# Patient Record
Sex: FEMALE | Race: OTHER | URBAN - METROPOLITAN AREA
[De-identification: names, ages, dates, MRNs, and addresses within clinical notes are randomized per-mention and may not be internally consistent; named-entity substitution may affect disease eponyms.]

---
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
## 2023-02-10 ENCOUNTER — LAB ONLY (OUTPATIENT)
Dept: FAMILY MEDICINE CLINIC | Age: 18
End: 2023-02-10

## 2023-02-10 ENCOUNTER — VIRTUAL VISIT (OUTPATIENT)
Dept: FAMILY MEDICINE CLINIC | Age: 18
End: 2023-02-10
Payer: COMMERCIAL

## 2023-02-10 DIAGNOSIS — J02.9 SORE THROAT: Primary | ICD-10-CM

## 2023-02-10 LAB
FLUAV+FLUBV AG NOSE QL IA.RAPID: NEGATIVE
FLUAV+FLUBV AG NOSE QL IA.RAPID: NEGATIVE
S PYO AG THROAT QL: POSITIVE
VALID INTERNAL CONTROL?: YES
VALID INTERNAL CONTROL?: YES

## 2023-02-10 NOTE — PROGRESS NOTES
Subjective: Marcelle Lemons presents for evaluation of Sore Throat     2-3 days of sore throat, enlarged tonsils, swollen lymph nodes and right sided headache, subjective fever (last yesterday)  Has been taking Advil for her sore throat  No known sick contact. COVID test negative  Hx of strep throat. Notes this feels similar      Physical Exam  General: alert, cooperative, no distress, appears stated age  Lung exam: no apparent respiratory distress or retractions. Able to speak in full sentences. Assessment/Plan:   1. Sore throat  -     AMB POC RAPID STREP A  -     AMB POC RAPID INFLUENZA TEST    Strep throat vs viral URI. Will have her come in for POC testing. If strep +, will send in antibiotics. Discussed symptomatic treatment with Advil. May use OTC cough/cold medication. Also consider lozenges, tea with honey, salt water gargles, etc.     The above diagnosis is a new problem. We discussed expected course, resolution, and complications of diagnosis in detail. Return if symptoms worsen or fail to improve. Dorinda Plaza, was evaluated through a synchronous (real-time) audio-video encounter. The patient (or guardian if applicable) is aware that this is a billable service. Verbal consent to proceed has been obtained within the past 12 months. The visit was conducted pursuant to the emergency declaration under the [address] [place] [address] authority and the [employer] Act. Patient identification was verified, and a caregiver was present when appropriate. The patient was located in a state where the provider was credentialed to provide care. Patient located at home ([place] [address])  Provider located at home ([place])    An electronic signature was used to authenticate this note.   -- Tyrone Sandhoff, MD

## 2023-02-15 ENCOUNTER — OFFICE VISIT (OUTPATIENT)
Dept: FAMILY MEDICINE CLINIC | Age: 18
End: 2023-02-15
Payer: COMMERCIAL

## 2023-02-15 VITALS
DIASTOLIC BLOOD PRESSURE: 75 MMHG | RESPIRATION RATE: 16 BRPM | BODY MASS INDEX: 16.39 KG/M2 | SYSTOLIC BLOOD PRESSURE: 103 MMHG | OXYGEN SATURATION: 98 % | WEIGHT: 102 LBS | TEMPERATURE: 97.9 F | HEIGHT: 66 IN | HEART RATE: 94 BPM

## 2023-02-15 DIAGNOSIS — J02.0 STREP PHARYNGITIS: ICD-10-CM

## 2023-02-15 DIAGNOSIS — K12.0 APHTHOUS ULCER: Primary | ICD-10-CM

## 2023-02-15 RX ORDER — AMOXICILLIN 500 MG/1
CAPSULE ORAL
COMMUNITY
Start: 2023-02-12

## 2023-02-15 NOTE — PROGRESS NOTES
Rm 13    Sores in mouth      Chief Complaint   Patient presents with    Mouth Lesions     1. Have you been to the ER, urgent care clinic since your last visit? Hospitalized since your last visit? No    2. Have you seen or consulted any other health care providers outside of the [address] [place] [address] since your last visit? Include any pap smears or colon screening.  No    Visit Vitals  /75 (BP 1 Location: Left upper arm, BP Patient Position: Sitting, BP Cuff Size: Adult)   Pulse (!) 112   Temp 97.9 °F (36.6 °C) (Oral)   Resp 16   Ht 5' 5.5\" (1.664 m)   Wt 102 lb (46.3 kg)   SpO2 98%   BMI 16.72 kg/m²

## 2023-02-15 NOTE — PROGRESS NOTES
[facility] [address] [place] [address]   Office [phone], Fax [phone]    Subjective:     Chief Complaint   Patient presents with    Mouth Lesions     History provided by patient     Kim Form is a 19 y.o. female presents for mouth ulcers. Ulcers have been present for about 5 days. There are about 5-6 total. They are located on the inside and outside of her lip and back of her throat. They have gotten slightly better since first appearing. She has been using an over the counter oragel mouth wash with numbing medicine in it with relief. She admits that she has had difficulty eating and drinking like normal due to the pain. Of note- was diagnosed with strep throat the same day the lesions began. Is currently still taking amox. Denies fevers. Medication reviewed. Current Outpatient Medications:     amoxicillin (AMOXIL) 500 mg capsule, TAKE 1 TABLET BY MOUTH TWICE DAILY FOR 10 DAYS, Disp: , Rfl:     clindamycin-tretinoin (VELTIN) 1.2-0.025 % topical gel, Apply pea-sized amount TOPICALLY to entire face at bedtime; dot onto chin, cheeks, nose, and forehead then gently rub over entire face. (Patient not taking: Reported on 2/15/2023), Disp: 30 g, Rfl: 3     Allergy reviewed. No Known Allergies     History reviewed. No pertinent past medical history.     Social History     Socioeconomic History    Marital status: SINGLE   Tobacco Use    Smoking status: Never    Smokeless tobacco: Never   Substance and Sexual Activity    Alcohol use: No    Sexual activity: Never     ROS:  General/Constitutional:  No headache, fever, fatigue, weight loss or weight gain     Eyes:  No redness, pain  Neck:  No stiffness, pain, or limited movement  Cardiac:   No chest pain    Respiratory:  No cough or shortness of breath    Skin: No rash     Objective:   Vitals - reviewed  Visit Vitals  /75 (BP 1 Location: Left upper arm, BP Patient Position: Sitting, BP Cuff Size: Adult)   Pulse 94   Temp 97.9 °F (36.6 °C) (Oral)   Resp 16   Ht 5' 5.5\" (1.664 m)   Wt 102 lb (46.3 kg)   SpO2 98%   BMI 16.72 kg/m²        Physical exam:   GEN: NAD. Alert. Well nourished. EYES:  Conjunctiva clear. EAR: External ears are normal. Tympanic membranes are clear and without effusion. NOSE: Turbinates are within normal limits. No drainage  OROPHYARYNX: There a 2 small white/yellow lesions with an erythematous border on inner bottom lip and 1 on posterior oropharynx as well as 2 small lesions on bottom lip near mucosal border. No crusting of lesions. No pharyngeal exudates. NECK:  Supple; no masses; thyroid normal, no LAD. LUNGS: Respirations unlabored; CTAB. no wheeze, rales, rhonchi   CARDIOVASCULAR: Regular, rate, and rhythm without murmurs, gallops or rubs   EXT: Well perfused. No edema. No erythema. PSYCH: appropriate mood and affect. Good insight and judgement. Cooperative. SKIN: No obvious rashes or lesions. Pertinent Labs/Studies:   - Strep test pos on 2/10/2023    Assessment and orders:       ICD-10-CM ICD-9-CM    1. Aphthous ulcer  K12.0 528. 2         Apthous ulcer: likely from underlying stress from recent strep infection. Discussed triamcinolone dental paste as an option, but would be unable to get some of the posterior lesions so will hold off  - Continue symptomatic care with oragel mouthwash  - OTC ibuprofen/tylenol prn for pain   - Stay well hydrated  - Advised ulcers will resolve with time       Follow up in prn    Pt was discussed with Dr Savana Chaidez (attending physician). I have reviewed patient medical and social history and medications. I have reviewed pertinent labs results and other data. I have discussed the diagnosis with the patient and the intended plan as seen in the above orders. The patient has received an after-visit summary and questions were answered concerning future plans. I have discussed medication side effects and warnings with the patient as well.     DO Juan Pearce [facility]  02/16/23

## 2023-02-24 ENCOUNTER — TELEPHONE (OUTPATIENT)
Dept: FAMILY MEDICINE CLINIC | Age: 18
End: 2023-02-24

## 2023-02-24 NOTE — TELEPHONE ENCOUNTER
----- Message from Nancy White sent at 2/24/2023  2:06 PM EST -----  Subject: Message to Provider    QUESTIONS  Information for Provider? Pt would like to schedule an appt for STD   testing, is currently dealing with bumps located on elbows, knees, and   hands. Sores were previously in pt mouth and the outside of her mouth (1   week and a half ago). Please contact as soon as possible to get appt   scheduled. ---------------------------------------------------------------------------  --------------  Tim Lemos SRZV  [phone]; OK to leave message on voicemail  ---------------------------------------------------------------------------  --------------  SCRIPT ANSWERS  Relationship to Patient? Self  Are you having swelling in your throat or face? No  Are you having difficulty breathing? No  Have the symptoms worsened or spread in the last day? No  Are you having fevers (100.4), chills or sweats? No  Have you recently (14 days) seen a provider for this issue? Yes  Have you been diagnosed with COVID-19 in the past 10 days? No  (Service Expert  click yes below to proceed with [employer] As Usual   Scheduling)?  Yes

## 2023-12-06 ENCOUNTER — OFFICE VISIT (OUTPATIENT)
Age: 18
End: 2023-12-06

## 2023-12-06 VITALS
HEART RATE: 74 BPM | BODY MASS INDEX: 18.16 KG/M2 | HEIGHT: 66 IN | RESPIRATION RATE: 16 BRPM | SYSTOLIC BLOOD PRESSURE: 96 MMHG | WEIGHT: 113 LBS | TEMPERATURE: 97.4 F | OXYGEN SATURATION: 99 % | DIASTOLIC BLOOD PRESSURE: 66 MMHG

## 2023-12-06 DIAGNOSIS — R30.0 DYSURIA: ICD-10-CM

## 2023-12-06 DIAGNOSIS — N30.01 ACUTE CYSTITIS WITH HEMATURIA: Primary | ICD-10-CM

## 2023-12-06 LAB
BILIRUBIN, URINE, POC: NEGATIVE
BLOOD URINE, POC: NORMAL
GLUCOSE URINE, POC: NEGATIVE
HCG, PREGNANCY, URINE, POC: NEGATIVE
KETONES, URINE, POC: NEGATIVE
LEUKOCYTE ESTERASE, URINE, POC: NORMAL
NITRITE, URINE, POC: NEGATIVE
PH, URINE, POC: 6 (ref 4.6–8)
PROTEIN,URINE, POC: NORMAL
SPECIFIC GRAVITY, URINE, POC: 1.03 (ref 1–1.03)
URINALYSIS CLARITY, POC: NORMAL
URINALYSIS COLOR, POC: YELLOW
UROBILINOGEN, POC: NORMAL
VALID INTERNAL CONTROL, POC: YES

## 2023-12-06 RX ORDER — CEPHALEXIN 500 MG/1
500 CAPSULE ORAL 4 TIMES DAILY
Qty: 28 CAPSULE | Refills: 0 | Status: SHIPPED | OUTPATIENT
Start: 2023-12-06 | End: 2023-12-13

## 2023-12-06 SDOH — ECONOMIC STABILITY: FOOD INSECURITY: WITHIN THE PAST 12 MONTHS, THE FOOD YOU BOUGHT JUST DIDN'T LAST AND YOU DIDN'T HAVE MONEY TO GET MORE.: NEVER TRUE

## 2023-12-06 SDOH — ECONOMIC STABILITY: FOOD INSECURITY: WITHIN THE PAST 12 MONTHS, YOU WORRIED THAT YOUR FOOD WOULD RUN OUT BEFORE YOU GOT MONEY TO BUY MORE.: NEVER TRUE

## 2023-12-06 SDOH — ECONOMIC STABILITY: INCOME INSECURITY: HOW HARD IS IT FOR YOU TO PAY FOR THE VERY BASICS LIKE FOOD, HOUSING, MEDICAL CARE, AND HEATING?: NOT VERY HARD

## 2023-12-06 SDOH — ECONOMIC STABILITY: HOUSING INSECURITY
IN THE LAST 12 MONTHS, WAS THERE A TIME WHEN YOU DID NOT HAVE A STEADY PLACE TO SLEEP OR SLEPT IN A SHELTER (INCLUDING NOW)?: NO

## 2023-12-06 ASSESSMENT — PATIENT HEALTH QUESTIONNAIRE - PHQ9
SUM OF ALL RESPONSES TO PHQ QUESTIONS 1-9: 0
SUM OF ALL RESPONSES TO PHQ QUESTIONS 1-9: 0
2. FEELING DOWN, DEPRESSED OR HOPELESS: 0
SUM OF ALL RESPONSES TO PHQ QUESTIONS 1-9: 0
SUM OF ALL RESPONSES TO PHQ QUESTIONS 1-9: 0
SUM OF ALL RESPONSES TO PHQ9 QUESTIONS 1 & 2: 0
1. LITTLE INTEREST OR PLEASURE IN DOING THINGS: 0

## 2023-12-06 NOTE — PROGRESS NOTES
[facility], [facility]   Office [phone], Fax [phone]    Subjective:     Chief Complaint   Patient presents with    Urinary Tract Infection     Started Monday QHS, pain with urination  + lower abdominal pain   Denies F/C       HPI:  Srini Barnes is a 19 y.o. female that presents for:    UTI Concerns:   Symptoms started 2 days ago, Monday night. Noted lower abdominal pain, described as a stabbing pain, and also saw blood in her urine. Increased frequency. LMP Nov 19th, with regular periods. Currently sexually active with one partner. No hx of STIs. Has not been using any protection. Health Maintenance:  Health Maintenance Due   Topic Date Due    Hepatitis B vaccine (1 of 3 - 3-dose series) Never done    HPV vaccine (1 - 2-dose series) Never done    HIV screen  Never done    Hepatitis C screen  Never done    Flu vaccine (1) 08/01/2023    COVID-19 Vaccine (3 - 2023-24 season) 09/01/2023          Past Medical Hx  I personally reviewed. History reviewed. No pertinent past medical history. SocHx   I personally reviewed.   Social History     Socioeconomic History    Marital status: Single     Spouse name: Not on file    Number of children: Not on file    Years of education: Not on file    Highest education level: Not on file   Occupational History    Not on file   Tobacco Use    Smoking status: Never     Passive exposure: Never    Smokeless tobacco: Never   Vaping Use    Vaping Use: Every day    Substances: Nicotine, Flavoring    Devices: Disposable, Pre-filled or refillable cartridge   Substance and Sexual Activity    Alcohol use: No    Drug use: Never    Sexual activity: Yes     Partners: Male     Birth control/protection: None   Other Topics Concern    Not on file   Social History Narrative    Not on file     Social Determinants of Health     Financial Resource Strain: Low Risk  (12/6/2023)    Overall Financial Resource Strain (CARDIA)     Difficulty of

## 2023-12-10 LAB
C TRACH RRNA SPEC QL NAA+PROBE: NEGATIVE
N GONORRHOEA RRNA SPEC QL NAA+PROBE: NEGATIVE
SPECIMEN SOURCE: NORMAL
T VAGINALIS RRNA SPEC QL NAA+PROBE: NEGATIVE

## 2024-03-15 ENCOUNTER — OFFICE VISIT (OUTPATIENT)
Age: 19
End: 2024-03-15

## 2024-03-15 VITALS
TEMPERATURE: 98.7 F | RESPIRATION RATE: 18 BRPM | HEART RATE: 89 BPM | SYSTOLIC BLOOD PRESSURE: 110 MMHG | WEIGHT: 110 LBS | DIASTOLIC BLOOD PRESSURE: 78 MMHG | OXYGEN SATURATION: 99 % | HEIGHT: 64 IN | BODY MASS INDEX: 18.78 KG/M2

## 2024-03-15 DIAGNOSIS — J02.9 SORE THROAT: Primary | ICD-10-CM

## 2024-03-15 LAB
GROUP A STREP ANTIGEN, POC: NEGATIVE
VALID INTERNAL CONTROL, POC: NORMAL

## 2024-03-15 ASSESSMENT — ENCOUNTER SYMPTOMS: COUGH: 1

## 2024-03-15 NOTE — PROGRESS NOTES
patient with instructions to follow-up with pediatrician  -Advised to go immediately to the ED for worsening or persistent symptoms      I have discussed the results, diagnosis and treatment plan with the patient.  The patient also understands that early in the process of an illness, an urgent care workup can be falsely reassuring.  Routine discharge counseling and specific return precautions discussed with patient and the patient understands that worsening, changing or persistent symptoms should prompt an immediate return to the urgent care or emergency department.  Patient/Guardian expressed understanding and agrees with the discharge plan.  No further questions at time of discharge.    Sheridan Amado, APRN - CNP

## 2024-03-15 NOTE — PATIENT INSTRUCTIONS
Please follow up with your primary care provider if your symptoms last more than 10 days or worsen.    Please go immediately to the Emergency Department if you develop:  -Fever higher than 102F (38.9C)  -Sudden and severe pain in the face and head  -Trouble seeing or seeing double  -Trouble thinking clearly  -Swelling or redness around one or both eyes  -A stiff neck    Sinusitis Symptomatic Relief  Nasal Congestion:  Flonase (over the counter) nasal spray, once a day  Saline irrigation kits help wash out sinuses 1-2 times a day  Normal saline nasal spray  Afrin nasal spray for no longer than 3 days    Congestion:  For thick mucus, take Mucinex (with Guafenesin only) to help thin the mucus.  Follow instructions on the box.  You will need to drink plenty of water with this medication.    Sore Throat:  Lozenges, as needed. Cepacol lozenges will help numb the throat  Chloraseptic spray also helps to numb throat pain  Salt water gargles to soothe throat pain    Sinus pain/pressure:  Warm, wet towel on face to help with facial sinus pain/pressure    Headache/Pain Fever/Body Aches:  If you can take NSAIDs, take Ibuprofen 400-800mg every 8 hours as needed  If you cannot take NSAIDs, take Tylenol 325-500mg every 6 hours as needed    Miscellanous:  Zyrtec/Xyzal/Allegra/Claritin during the day or Benadryl at night may help with allergies.  You may use the decongestant version of these medications as well.  Simple foods like chicken noodle soup, smoothies, hot tea with lemon and honey may also help

## 2024-03-16 ASSESSMENT — ENCOUNTER SYMPTOMS
SORE THROAT: 1
SHORTNESS OF BREATH: 0

## 2024-04-15 ENCOUNTER — HOSPITAL ENCOUNTER (EMERGENCY)
Facility: HOSPITAL | Age: 19
Discharge: HOME | End: 2024-04-16
Attending: EMERGENCY MEDICINE
Payer: COMMERCIAL

## 2024-04-15 VITALS
HEART RATE: 71 BPM | BODY MASS INDEX: 19.41 KG/M2 | SYSTOLIC BLOOD PRESSURE: 115 MMHG | WEIGHT: 113.1 LBS | OXYGEN SATURATION: 100 % | DIASTOLIC BLOOD PRESSURE: 73 MMHG | RESPIRATION RATE: 18 BRPM | TEMPERATURE: 98.1 F
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
## 2024-04-15 DIAGNOSIS — S16.1XXA STRAIN OF NECK MUSCLE, INITIAL ENCOUNTER: ICD-10-CM

## 2024-04-15 DIAGNOSIS — M54.6 PAIN IN THORACIC SPINE: ICD-10-CM

## 2024-04-15 PROCEDURE — 99283 EMERGENCY DEPT VISIT LOW MDM: CPT

## 2024-04-15 RX ORDER — CYCLOBENZAPRINE HCL 5 MG
10 TABLET ORAL 3 TIMES DAILY PRN
Qty: 20 TABLET | Refills: 0 | Status: SHIPPED | OUTPATIENT
Start: 2024-04-15 | End: 2024-04-20

## 2024-04-15 ASSESSMENT — PAIN - FUNCTIONAL ASSESSMENT: PAIN_FUNCTIONAL_ASSESSMENT: 0-10

## 2024-04-15 ASSESSMENT — PAIN SCALES - GENERAL: PAINLEVEL_OUTOF10: 4

## 2024-04-15 ASSESSMENT — PAIN DESCRIPTION - LOCATION: LOCATION: NECK;BACK

## 2024-04-15 ASSESSMENT — PAIN DESCRIPTION - DESCRIPTORS: DESCRIPTORS: ACHING;SORE

## 2024-04-15 ASSESSMENT — PAIN DESCRIPTION - ORIENTATION: ORIENTATION: MID;UPPER
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
## 2024-04-16 NOTE — DISCHARGE INSTRUCTIONS
Routine appointments for health maintenance with a primary care provider are very important and emergency department visits are no substitute.  You should review all findings and test results from your visit today with your primary care physician.    Please try to rest, use ice to help with symptoms.    Use ice every 2 hours for 20 minutes to help alleviate inflammation and pain.        We recommended that you take medications as prescribed.    You may take 1 or 2 pills of Tylenol every 6 hours as needed for pain or fever.  You should not take this medication with other medications that contain acetaminophen/Tylenol which could include prescription pain medications or other combo over-the-counter medications.  You should not exceed more than 4000 mg in a 24 hour.    You may use 800 mg of ibuprofen every 6 hours as needed for pain or fever.  You should NOT take this medication if you're taking other NSAID/anti-inflammatory medications or on steroids or other blood thinning medications.     Please do not drive, operate heavy machinery, swim, bathe or perform any other activities as you may risk injury to yourself or others.        Return to the emergency department for any new or concerning signs/symptoms or failure to improve.
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
## 2024-11-15 ENCOUNTER — HOSPITAL ENCOUNTER (OUTPATIENT)
Facility: HOSPITAL | Age: 19
Setting detail: SPECIMEN
Discharge: HOME | End: 2024-11-18

## 2024-11-15 ENCOUNTER — OFFICE VISIT (OUTPATIENT)
Age: 19
End: 2024-11-15

## 2024-11-15 VITALS
DIASTOLIC BLOOD PRESSURE: 64 MMHG | BODY MASS INDEX: 19.88 KG/M2 | WEIGHT: 112.2 LBS | SYSTOLIC BLOOD PRESSURE: 93 MMHG | RESPIRATION RATE: 16 BRPM | HEART RATE: 96 BPM | HEIGHT: 63 IN | TEMPERATURE: 97.6 F | OXYGEN SATURATION: 99 %

## 2024-11-15 DIAGNOSIS — R41.840 INATTENTION: ICD-10-CM

## 2024-11-15 DIAGNOSIS — Z01.419 WELL WOMAN EXAM WITH ROUTINE GYNECOLOGICAL EXAM: Primary | ICD-10-CM

## 2024-11-15 DIAGNOSIS — Z12.4 CERVICAL CANCER SCREENING: ICD-10-CM

## 2024-11-15 DIAGNOSIS — N94.6 DYSMENORRHEA: ICD-10-CM

## 2024-11-15 LAB
HCG, PREGNANCY, URINE, POC: NEGATIVE
VALID INTERNAL CONTROL, POC: NORMAL

## 2024-11-15 PROCEDURE — 87591 N.GONORRHOEAE DNA AMP PROB: CPT

## 2024-11-15 PROCEDURE — 88175 CYTOPATH C/V AUTO FLUID REDO: CPT

## 2024-11-15 PROCEDURE — 87491 CHLMYD TRACH DNA AMP PROBE: CPT

## 2024-11-15 PROCEDURE — 87661 TRICHOMONAS VAGINALIS AMPLIF: CPT

## 2024-11-15 ASSESSMENT — PATIENT HEALTH QUESTIONNAIRE - PHQ9
SUM OF ALL RESPONSES TO PHQ QUESTIONS 1-9: 5
SUM OF ALL RESPONSES TO PHQ9 QUESTIONS 1 & 2: 3
9. THOUGHTS THAT YOU WOULD BE BETTER OFF DEAD, OR OF HURTING YOURSELF: NOT AT ALL
SUM OF ALL RESPONSES TO PHQ QUESTIONS 1-9: 5
5. POOR APPETITE OR OVEREATING: NOT AT ALL
6. FEELING BAD ABOUT YOURSELF - OR THAT YOU ARE A FAILURE OR HAVE LET YOURSELF OR YOUR FAMILY DOWN: NOT AT ALL
2. FEELING DOWN, DEPRESSED OR HOPELESS: NOT AT ALL
SUM OF ALL RESPONSES TO PHQ QUESTIONS 1-9: 5
10. IF YOU CHECKED OFF ANY PROBLEMS, HOW DIFFICULT HAVE THESE PROBLEMS MADE IT FOR YOU TO DO YOUR WORK, TAKE CARE OF THINGS AT HOME, OR GET ALONG WITH OTHER PEOPLE: NOT DIFFICULT AT ALL
7. TROUBLE CONCENTRATING ON THINGS, SUCH AS READING THE NEWSPAPER OR WATCHING TELEVISION: NOT AT ALL
3. TROUBLE FALLING OR STAYING ASLEEP: SEVERAL DAYS
4. FEELING TIRED OR HAVING LITTLE ENERGY: SEVERAL DAYS
8. MOVING OR SPEAKING SO SLOWLY THAT OTHER PEOPLE COULD HAVE NOTICED. OR THE OPPOSITE, BEING SO FIGETY OR RESTLESS THAT YOU HAVE BEEN MOVING AROUND A LOT MORE THAN USUAL: NOT AT ALL
1. LITTLE INTEREST OR PLEASURE IN DOING THINGS: NEARLY EVERY DAY
SUM OF ALL RESPONSES TO PHQ QUESTIONS 1-9: 5

## 2024-11-15 NOTE — PROGRESS NOTES
HPI:  Fortunato Chanel is a 19 y.o. female presenting for well woman exam.     LMP within last month   Length of periods: 5-7 days  Menstrual flow: medium   G0  Sexually active?: yes  Method of family planning: desires IUD, tried pills in the past  Diet: nothing specific   Exercise: not currently     ADHD: trouble focusing, simple tasks become overwhelming sometimes, gets off track easily     Immunizations - reviewed:   Immunization History   Administered Date(s) Administered    COVID-19, MODERNA BLUE border, Primary or Immunocompromised, (age 12y+), IM, 100 mcg/0.5mL 07/12/2021, 08/09/2021    DTP 2003, 04/01/2004, 08/26/2004, 10/16/2007    HPV Quadrivalent (Gardasil) 01/18/2017, 07/07/2020    Hep A, HAVRIX, VAQTA, (age 12m-18y), IM, 0.5mL 04/09/2012, 01/18/2017    Hep B, ENGERIX-B, RECOMBIVAX-HB, (age Birth - 19y), IM, 0.5mL 08/26/2004, 05/13/2005, 08/23/2006    Hib vaccine 2003, 04/01/2004, 08/26/2004    Influenza Virus Vaccine 10/16/2007, 11/09/2015    Influenza, FLUARIX, FLULAVAL, FLUZONE (age 6 mo+) and AFLURIA, (age 3 y+), Quadv PF, 0.5mL 11/09/2015    MMR, PRIORIX, M-M-R II, (age 12m+), SC, 0.5mL 08/26/2004, 08/20/2008    Meningococcal, MCV4, Unspecifd Conjugate (A,C,Y and W-135) 01/18/2017, 07/07/2020    Pneumococcal Conjugate 7-valent (Prevnar7) 2003, 04/01/2004    Poliovirus, IPOL, (age 6w+), SC/IM, 0.5mL 2003, 04/01/2004, 08/26/2004, 10/16/2007    TDaP, ADACEL (age 10y-64y), BOOSTRIX (age 10y+), IM, 0.5mL 04/02/2014    Varicella, VARIVAX, (age 12m+), SC, 0.5mL 08/26/2004, 01/18/2017     Flu: declines   Tdap: 2014, declines today   Pneumovax (>66yo or earlier if risks): n/a  Zostervax (>49yo): n/a    Health Maintenance reviewed -  Pap smear today   Mammogram n/a  Colonoscopy n/a  DEXA scan (>66yo) n/a    Allergies- reviewed:   No Known Allergies      Medications- reviewed:   No current outpatient medications on file.     No current facility-administered medications for this

## 2024-11-15 NOTE — PROGRESS NOTES
Fortunato De AndaArceliaHoward is a 19 y.o. female      Chief Complaint   Patient presents with    Gynecologic Exam     - would like to discuss birth control and starting an ADHD medication     - patient has provided urine for a pregnancy test before starting birth control     \"Have you been to the ER, urgent care clinic since your last visit?  Hospitalized since your last visit?\"    NO    “Have you seen or consulted any other health care providers outside of [facility] since your last visit?”    NO        “Have you had a pap smear?”    NO - will receive one today     No cervical cancer screening on file            Vitals:    11/15/24 0952   BP: 93/64   Site: Left Upper Arm   Position: Sitting   Cuff Size: Child   Pulse: 96   Resp: 16   Temp: 97.6 °F (36.4 °C)   TempSrc: Oral   SpO2: 99%   Weight: 50.9 kg (112 lb 3.2 oz)   Height: 1.6 m (5' 3\")            Health Maintenance Due   Topic Date Due    HIV screen  Never done    Hepatitis C screen  Never done    Pap smear  Never done    DTaP/Tdap/Td vaccine (6 - Td or Tdap) 04/02/2024    Flu vaccine (1) 08/01/2024    COVID-19 Vaccine (3 - 2023-24 season) 09/01/2024    Depression Screen  12/06/2024    Chlamydia/GC screen  12/06/2024         Medication Reconciliation completed, changes noted.  Please  Update medication list.

## 2024-12-13 ENCOUNTER — PROCEDURE VISIT (OUTPATIENT)
Age: 19
End: 2024-12-13

## 2024-12-13 ENCOUNTER — TELEPHONE (OUTPATIENT)
Age: 19
End: 2024-12-13

## 2024-12-13 VITALS
SYSTOLIC BLOOD PRESSURE: 104 MMHG | RESPIRATION RATE: 18 BRPM | TEMPERATURE: 98.3 F | OXYGEN SATURATION: 98 % | BODY MASS INDEX: 20.02 KG/M2 | DIASTOLIC BLOOD PRESSURE: 67 MMHG | HEART RATE: 97 BPM | HEIGHT: 63 IN | WEIGHT: 113 LBS

## 2024-12-13 DIAGNOSIS — Z30.430 ENCOUNTER FOR IUD INSERTION: Primary | ICD-10-CM

## 2024-12-13 DIAGNOSIS — N94.6 DYSMENORRHEA: ICD-10-CM

## 2024-12-13 LAB
HCG, PREGNANCY, URINE, POC: NEGATIVE
VALID INTERNAL CONTROL, POC: YES

## 2024-12-13 NOTE — PROGRESS NOTES
Chief Complaint   Patient presents with    Procedure     Patient is coming in for an IUD insertion. LMP was 12/12/2024. Last sexual intercourse was 3 weeks ago. Not Breastfeeding. Not on any other birth control. No other concerns.      I saw and evaluated the patient, performing the key elements of the service.  I discussed the findings, assessment and plan with the resident and agree with the resident's findings and plan as documented in the resident's note.

## 2024-12-13 NOTE — TELEPHONE ENCOUNTER
While running the patient's insurance the RTE returned a message stating \"error: Duplicate Subscriber\"    I printed out a copy of the RTE Verification page that shows the error message for the patient and advised her to reach out to insurance to see if there were any changes.

## 2024-12-13 NOTE — PROGRESS NOTES
[id number] [id number]   Office [phone], Fax [phone]    IUD PROCEDURE PROGRESS NOTE    LMP 12/12  Last sexual activity: 3 weeks ago  Not breastfeeding  Took tylenol prior to procedure    Chart reviewed for the following:   Gianna DOMINGUEZ MD, have reviewed the History, Physical and updated the Allergic reactions for Fortunato Chanel     TIME OUT performed immediately prior to start of procedure:   Gianna DOMINGUEZ MD, have performed the following reviews on Fortunato Chanel prior to the start of the procedure:            * Patient was identified by name and date of birth   * Agreement on procedure being performed was verified  * Risks and Benefits explained to the patient  * Procedure site verified and marked as necessary  * Patient was positioned for comfort  * Consent was signed and verified     Time: 8:47 AM    Date of procedure: 12/13/2024    Procedure performed by:  Gianna Benton    Provider assisted by: Jennifer Ingram LPN     Patient assisted by: Dr. Marylou Cortes    How tolerated by patient: well    Comments: none    Procedure:    Pt understands the method and alternatives and agrees to IUD placement.  Patient counseled on risks of IUD placement including but not limited to bleeding, infection, uterine perforation, and pregnancy.  Patient is aware that IUDs do not protect against STDs.  Patient was counseled that some bleeding and cramping are normal for the first few months.      Urine pregnancy test was negative.    Pt placed in dorsal lithotomy position.  A sterile speculum was placed in the patient’s vagina.      The cervix was cleansed with betadine solution.    The upper lip of the cervix was grasped with a single toothed tenaculum and gentle traction was applied to align the cervical canal with the uterine cavity.     The uterus was then sounded to 7 cm.  The flange on the Kyleena was then positioned to correlate with the depth of the

## 2024-12-13 NOTE — PROGRESS NOTES
Fortunato Chanel is a 19 y.o. female      Chief Complaint   Patient presents with    Procedure     Patient is coming in for an IUD insertion. LMP was 12/12/2024. Last sexual intercourse was 3 weeks ago. Not Breastfeeding. Not on any other birth control. No other concerns.        \"Have you been to the ER, urgent care clinic since your last visit?  Hospitalized since your last visit?\"    NO    “Have you seen or consulted any other health care providers outside of [facility] since your last visit?”    NO              Vitals:    12/13/24 0849   BP: 104/67   Site: Right Upper Arm   Position: Sitting   Pulse: 97   Resp: 18   Temp: 98.3 °F (36.8 °C)   TempSrc: Oral   SpO2: 98%   Weight: 51.3 kg (113 lb)   Height: 1.6 m (5' 3\")            Health Maintenance Due   Topic Date Due    HIV screen  Never done    Hepatitis C screen  Never done    DTaP/Tdap/Td vaccine (6 - Td or Tdap) 04/02/2024    Flu vaccine (1) 08/01/2024    COVID-19 Vaccine (3 - 2023-24 season) 09/01/2024         Medication Reconciliation completed, changes noted.  Please  Update medication list.

## 2025-01-10 ENCOUNTER — PROCEDURE VISIT (OUTPATIENT)
Age: 20
End: 2025-01-10
Payer: COMMERCIAL

## 2025-01-10 DIAGNOSIS — R10.2 PELVIC PAIN: Primary | ICD-10-CM

## 2025-01-10 DIAGNOSIS — Z30.431 IUD CHECK UP: ICD-10-CM

## 2025-01-10 LAB
BILIRUBIN, URINE, POC: NEGATIVE
BLOOD URINE, POC: NORMAL
GLUCOSE URINE, POC: NEGATIVE
HCG, PREGNANCY, URINE, POC: NEGATIVE
KETONES, URINE, POC: NEGATIVE
LEUKOCYTE ESTERASE, URINE, POC: NEGATIVE
NITRITE, URINE, POC: NEGATIVE
PH, URINE, POC: 5.5 (ref 4.6–8)
PROTEIN,URINE, POC: NEGATIVE
SPECIFIC GRAVITY, URINE, POC: 1.02 (ref 1–1.03)
URINALYSIS CLARITY, POC: NORMAL
URINALYSIS COLOR, POC: YELLOW
UROBILINOGEN, POC: NORMAL
VALID INTERNAL CONTROL, POC: NORMAL

## 2025-01-10 PROCEDURE — 81003 URINALYSIS AUTO W/O SCOPE: CPT | Performed by: FAMILY MEDICINE

## 2025-01-10 NOTE — PROGRESS NOTES
[address] [place] [address]   Office [phone], Fax [phone]    Eran Chanel is a 19 y.o. female who presents for Pelvic Pain    #cramping  - ongoing since IUD placed 12/13  - midline, intermittent cramping; 1-2x per day for a few-30 minutes.  - physical activity makes it worse  - bleeding for 2 weeks spotting after IUD placement  - 2 days ago started period  - sometimes takes tylenol / ibuprofen to help  - sexually active with one partner  - last intercourse 1 week ago  - no hx of sti  - no discharge  - no urinary frequency/burning, no stool changes. No headache/fever/chills/N/V.    Review of Systems   As above.    Medical History  No past medical history on file.    Medications  No current outpatient medications on file.     No current facility-administered medications for this visit.       Immunizations   Immunization History   Administered Date(s) Administered    COVID-19, MODERNA BLUE border, Primary or Immunocompromised, (age 12y+), IM, 100 mcg/0.5mL 07/12/2021, 08/09/2021    DTP 2003, 04/01/2004, 08/26/2004, 10/16/2007    HPV Quadrivalent (Gardasil) 01/18/2017, 07/07/2020    Hep A, HAVRIX, VAQTA, (age 12m-18y), IM, 0.5mL 04/09/2012, 01/18/2017    Hep B, ENGERIX-B, RECOMBIVAX-HB, (age Birth - 19y), IM, 0.5mL 08/26/2004, 05/13/2005, 08/23/2006    Hib vaccine 2003, 04/01/2004, 08/26/2004    Influenza Virus Vaccine 10/16/2007, 11/09/2015    Influenza, FLUARIX, FLULAVAL, FLUZONE (age 6 mo+) and AFLURIA, (age 3 y+), Quadv PF, 0.5mL 11/09/2015    MMR, PRIORIX, M-M-R II, (age 12m+), SC, 0.5mL 08/26/2004, 08/20/2008    Meningococcal, MCV4, Unspecifd Conjugate (A,C,Y and W-135) 01/18/2017, 07/07/2020    Pneumococcal Conjugate 7-valent (Prevnar7) 2003, 04/01/2004    Poliovirus, IPOL, (age 6w+), SC/IM, 0.5mL 2003, 04/01/2004, 08/26/2004, 10/16/2007    TDaP, ADACEL (age 10y-64y), BOOSTRIX (age 10y+), IM, 0.5mL 04/02/2014    Varicella, VARIVAX, (age

## 2025-01-10 NOTE — PROGRESS NOTES
I saw and evaluated the patient, performing the key elements of the service.  I discussed the findings, assessment and plan with the resident and agree with the resident's findings and plan as documented in the resident's note.    Pelvic pain - patient with on/off cramping daily since IUD inserted about a month ago.  No other concerning symptoms, recently started menstrual cycle for the first time.  Says pain is slowly getting better overall but still daily.  Ddx includes: normal cramping following IUD insertion v malposition v ovarian cysts v vaginitis (testing sent today).  Strongly suspect normal cramping following IUD insertion and if so discussed that I would expect that to slowly improve over the first 3 months after insertion.  Encouraged ibuprofen use which pt says helps.  Will await US.

## 2025-02-28 ENCOUNTER — HOSPITAL ENCOUNTER (OUTPATIENT)
Facility: HOSPITAL | Age: 20
Discharge: HOME | End: 2025-02-28
Payer: COMMERCIAL

## 2025-02-28 DIAGNOSIS — Z30.431 IUD CHECK UP: ICD-10-CM

## 2025-02-28 PROCEDURE — 76856 US EXAM PELVIC COMPLETE: CPT

## 2025-02-28 PROCEDURE — 76830 TRANSVAGINAL US NON-OB: CPT
